# Patient Record
Sex: FEMALE | Race: WHITE | Employment: FULL TIME | URBAN - METROPOLITAN AREA
[De-identification: names, ages, dates, MRNs, and addresses within clinical notes are randomized per-mention and may not be internally consistent; named-entity substitution may affect disease eponyms.]

---

## 2017-01-19 PROCEDURE — 80069 RENAL FUNCTION PANEL: CPT | Performed by: INTERNAL MEDICINE

## 2017-01-19 PROCEDURE — 36415 COLL VENOUS BLD VENIPUNCTURE: CPT | Performed by: INTERNAL MEDICINE

## 2018-05-29 PROBLEM — I10 ESSENTIAL HYPERTENSION: Status: ACTIVE | Noted: 2018-05-29

## 2025-03-06 NOTE — H&P
[facility]  Division of Cardiology  Pre-procedure  Updated H&P       Patient Name: Violetta Caruso  MRN: [id number]  [facility]: [id number]  YOB: 1955    Diagnosis: Abnormal stress test.    Present Illness: See clinic consult note for details.  Atypical CP lead to a stress test that was abnormal and she was sent for an angiogram given the high risk features of the stress test.    Interval change: None.    Home Medications:  Medications Prior to Admission   Medication Sig    Cholecalciferol (VITAMIN D-3 OR) Take by mouth daily.    Multiple Vitamins-Minerals (MULTIVITAMIN WOMEN 50+ OR) Take 1 tablet by mouth daily.    VITAMIN K OR Take 1 tablet by mouth daily. Vitamin k complete  K1-500mcg  K2-1500mcg    Misc Natural Products (BEET ROOT OR) Take 2 tablets by mouth daily. gummy    NON FORMULARY Take 1 oz by mouth daily. NOPALEA-fruit of pricklepear cactus    NON FORMULARY Take by mouth daily. Nitric Oxide powder-totals below  2000mg beet root  L-arginine hydrochlocide 400mg  Arginine ketoglutarate 400 mg  L-citrulline 400 mg  Horse chestnut extract 300mg    losartan Potassium 50 MG Oral Tab Take 1 tablet (50 mg total) by mouth daily.    hydroCHLOROthiazide 12.5 MG Oral Cap Take 1 capsule (12.5 mg total) by mouth daily.    Magnesium 500 MG Oral Tab Take by mouth.    aspirin-acetaminophen-caffeine 250-250-65 MG Oral Tab Take 1 tablet by mouth every 6 (six) hours as needed for Pain.    Vitamin B-12 1000 MCG Oral Tab Take 1 tablet (1,000 mcg total) by mouth daily.    Glucosamine-Chondroit-Vit C-Mn (GLUCOSAMINE CHONDROITIN COMPLX) Oral Cap Take 2 tablets by mouth daily.         Allergies: Allergies[1]    History:  Past Medical History:    Esophageal reflux    Essential hypertension    FHx: migraine headaches    Fibroids    H/O: hysterectomy    Headaches, cluster    High blood pressure    Intermittent palpitations    Migraines    Visual impairment    wears glasses     Past  Surgical History:   Procedure Laterality Date    Correct bunion,othr methods Left 2014    Foot surgery Left     Hysterectomy      ovaries still in fibroids/ partial hysterectomy          x3     Social History     Tobacco Use    Smoking status: Former     Current packs/day: 0.00     Average packs/day: 0.5 packs/day for 6.0 years (3.0 ttl pk-yrs)     Types: Cigarettes     Start date: 1969     Quit date: 1975     Years since quittin.8    Smokeless tobacco: Never   Substance Use Topics    Alcohol use: Yes     Comment: socially     Family History   Problem Relation Age of Onset    Heart Disorder Father         mi 60's    Hypertension Father     Cancer Father         skin cancer    Other (Other) Father          in car accident, but he did have mva    Cancer Mother         tongue cancer /non smoker    Hypertension Mother     Other (Other) Mother     Diabetes Sister         borderline    Other (Other) Sister     Heart Disorder Brother         mi 50's    Other (Other) Brother     Other (Other) Brother         AAA    Other (Other) Son         lupus & hypothyroid    Cancer Maternal Grandmother         throat cancer    Cancer Paternal Grandmother         skin cancer    Other (Other) Paternal Grandfather         emphysema       Objective:  /83 (BP Location: Right arm)   Pulse 70   Temp 97.3 °F (36.3 °C) (Temporal)   Resp 16   Ht 160 cm (5' 3\")   Wt 177 lb (80.3 kg)   SpO2 97%   BMI 31.35 kg/m²     Exam:  General: NAD  Neck: No JVD  Lungs: CTA bilat  Heart: RRR, S1, S2  Abdomen: Soft, NT/ND, BS+x4  Extremities: Warm, dry, no LE edema bilat  Pulses: 2+ bilat DP  Skin: no rashes or legions noted  Neurological:  AAOx3, MAEW    Labs:  Please see the \"Easy-to-Access-N-Use\" [employer] EMR CareEverywherePP Tab!       Plan:  Cardiac catheterization, coronary angio, +/- PCI.  Further recommendations after above.    Informed Consent:  I've discussed the procedure at length with the patient including the  risks, benefits, and alternatives.  They wish to proceed.  I've reviewed the H&P and any changes within the last 30 days are noted above.    Thank you for allowing our group to care for your patient. Please contact me with any questions!     Efraín Herrera MD  General, Interventional  Structural & Endovascular  Cardiology                [1]   Allergies  Allergen Reactions    Advil [Ibuprofen] OTHER (SEE COMMENTS)     Other headache    Aleve [Naprelan] OTHER (SEE COMMENTS)     Severe headache    Amoxicillin OTHER (SEE COMMENTS)     Lip swelling    Codeine Phosphate NAUSEA AND VOMITING and OTHER (SEE COMMENTS)     Severe headache    Erythromycin Base OTHER (SEE COMMENTS)     Severe headache    Lodine [Etodolac] NAUSEA AND VOMITING and OTHER (SEE COMMENTS)     Other headache    Pcn [Penicillins] RASH and OTHER (SEE COMMENTS)     Lips swelled

## 2025-03-18 NOTE — PAT NURSING NOTE
Per PAT encounter/MyChart message sent to pt:    PreOp Instructions     You are scheduled for: a Cardiac Procedure     Date of Procedure: 03/21/25 Friday     Diet Instructions: Do not eat or drink anything after midnight including gum, mints, candy, etc.     Medications: Medications you are allowed to take can be taken with a sip of water the morning of your procedure, Take Aspirin 81 mg x 4 tablets the day of your procedure     Medications to Stop: Hold herbal supplements and vitamins     Other Medications: Do NOT take your Hydrochlorothiazide dose on Friday morning 3/21.     Skin Prep : Shower with antibacterial soap using a clean washcloth, prior to procedure. Once dried off, no lotions/powders/creams/ointments, etc., Do not shave the procedure area, this will be completed at the hospital during the preparation phase.     Arrival Time: The day prior to your procedure (Thursday) you will receive a phone call before 6:00 pm with your arrival time. If you haven't received a phone call, please check your voicemail messages., If you did not receive a voice mail and it is after 6:00 pm, please call the nursing supervisor at [phone].    Driving After Procedure: Sedation will be given so you WILL NOT be able to drive home. You will need a responsible adult  to drive you home. You can NOT take uber or taxi unless approved by your physician in advance.     Discharge Teaching: Your nurse will give you specific instructions before discharge, Most people can resume normal activities in 2-3 days, Any questions, please call the physician's office     We recommend you bring an overnight bag that you leave in the car the day of your procedure in case you need to stay overnight. We do not have lockers to lock up belongings so your  would have to hold onto the bag while you are in the procedure. Examples would be chargers for electronics you need, toiletries, change of clothes, etc.      parking is  available starting at 6 am or [facility]. Check in at the [facility] reception desk. Our  will be there to check you in for your procedure. Please bring your insurance cards and ID with you.                                                                                                                                      Please DO NOT respond to this message, the inbasket is not monitored for messages. For any questions, please call the physician's office.    Of note: reset pt's password in order for her to sign in for [employer] use.

## 2025-03-21 ENCOUNTER — HOSPITAL ENCOUNTER (OUTPATIENT)
Dept: INTERVENTIONAL RADIOLOGY/VASCULAR | Facility: HOSPITAL | Age: 70
Discharge: HOME | End: 2025-03-21
Attending: INTERNAL MEDICINE | Admitting: INTERNAL MEDICINE
Payer: COMMERCIAL

## 2025-03-21 VITALS
BODY MASS INDEX: 31.36 KG/M2 | WEIGHT: 177 LBS | OXYGEN SATURATION: 94 % | SYSTOLIC BLOOD PRESSURE: 118 MMHG | HEIGHT: 63 IN | DIASTOLIC BLOOD PRESSURE: 69 MMHG | TEMPERATURE: 97 F | HEART RATE: 76 BPM | RESPIRATION RATE: 17 BRPM

## 2025-03-21 DIAGNOSIS — R94.39 ABNORMAL STRESS TEST: ICD-10-CM

## 2025-03-21 PROCEDURE — B2111ZZ FLUOROSCOPY OF MULTIPLE CORONARY ARTERIES USING LOW OSMOLAR CONTRAST: ICD-10-PCS | Performed by: INTERNAL MEDICINE

## 2025-03-21 PROCEDURE — 99153 MOD SED SAME PHYS/QHP EA: CPT | Performed by: INTERNAL MEDICINE

## 2025-03-21 PROCEDURE — 4A023N7 MEASUREMENT OF CARDIAC SAMPLING AND PRESSURE, LEFT HEART, PERCUTANEOUS APPROACH: ICD-10-PCS | Performed by: INTERNAL MEDICINE

## 2025-03-21 PROCEDURE — 99152 MOD SED SAME PHYS/QHP 5/>YRS: CPT | Performed by: INTERNAL MEDICINE

## 2025-03-21 PROCEDURE — 93458 L HRT ARTERY/VENTRICLE ANGIO: CPT | Performed by: INTERNAL MEDICINE

## 2025-03-21 PROCEDURE — B2151ZZ FLUOROSCOPY OF LEFT HEART USING LOW OSMOLAR CONTRAST: ICD-10-PCS | Performed by: INTERNAL MEDICINE

## 2025-03-21 RX ORDER — MIDAZOLAM HYDROCHLORIDE 1 MG/ML
INJECTION INTRAMUSCULAR; INTRAVENOUS
Status: COMPLETED
Start: 2025-03-21 | End: 2025-03-21

## 2025-03-21 RX ORDER — HEPARIN SODIUM 5000 [USP'U]/ML
INJECTION, SOLUTION INTRAVENOUS; SUBCUTANEOUS
Status: COMPLETED
Start: 2025-03-21 | End: 2025-03-21

## 2025-03-21 RX ORDER — VERAPAMIL HYDROCHLORIDE 2.5 MG/ML
INJECTION, SOLUTION INTRAVENOUS
Status: COMPLETED
Start: 2025-03-21 | End: 2025-03-21

## 2025-03-21 RX ORDER — NITROGLYCERIN 20 MG/100ML
INJECTION INTRAVENOUS
Status: COMPLETED
Start: 2025-03-21 | End: 2025-03-21

## 2025-03-21 RX ORDER — LIDOCAINE HYDROCHLORIDE 10 MG/ML
INJECTION, SOLUTION EPIDURAL; INFILTRATION; INTRACAUDAL; PERINEURAL
Status: COMPLETED
Start: 2025-03-21 | End: 2025-03-21

## 2025-03-21 RX ORDER — SODIUM CHLORIDE 9 MG/ML
INJECTION, SOLUTION INTRAVENOUS
Status: COMPLETED | OUTPATIENT
Start: 2025-03-21 | End: 2025-03-21

## 2025-03-21 RX ADMIN — SODIUM CHLORIDE: 9 INJECTION, SOLUTION INTRAVENOUS at 12:47:00

## 2025-03-21 NOTE — PROGRESS NOTES
Pt received s/p LHC with Dr. Herrera. Right radial arterial access site closed with Vasc band with air in cuff. Site CDI, no bleeding or hematoma present. Vasc band removed per protocol. Site remains unchanged. Recovery complete. Discharge instructions given to pt and pt's . IV discontinued. Dr. Herrera spoke with pt and pt's family. Pt taken down to [facility] via wheelchair for discharge.

## 2025-03-21 NOTE — PROCEDURES
[facility] Division of Cardiology   Cardiac Catheterization & Percutaneous Coronary Intervention     Violetta Caruso Location: [facility] Cath Lab    CSN [id number] MRN [id number]   Admission Date 3/21/2025 Procedure Date 3/21/2025   Attending Physician Camila Zafar MD Procedure Physician Efraín Herrera MD     PREOPERATIVE DIAGNOSIS:  Abnormal stress test, chest pain  POSTOPERATIVE DIAGNOSIS:  Mild, non-obstructive CAD  PROCEDURE PERFORMED:  Coronary angiogram      PROCEDURE:    Moderate sedation: The patient was brought to the cardiac catheterization lab in the fasting state.  Informed consent was previously obtained.  Moderate sedation was employed using 3mg IV Versed and 75mcg IV Fentanyl.  I directly observed the patient from 1446 to 1510, watching the heart rate, blood pressure, oximetry, and rhythm.  An independent, trained observer was present throughout the procedure and assisted in the monitoring of the patient's level of consciousness and physiologic states.  Please see the Partly Catheterization Report (MCRTM) in [facility] for further, technical details.  Vascular access and catheter placement:  A 6F right radial, slender sheath was utilized after micropuncture access gained.  A Fallon catheter was used for LM and for RCA engagement and subsequent angiography.  Standard over the wire technique was utilized.  I used a pigtail catheter to cross the aortic valve and measure LVEDP and the transaortic pullback gradient.  We performed a contrast ventriculogram. SONG and MARTINEZ angiographic views with caudal and cranial angulation were used for cineangiography.  Hemodynamics were measured in the standard fashion using fluid filled, pressure manometry via the ASSISTTM device and analyzed with the [employer] catheterization software.  At the end of the case, a TRTM band was used for arterial hemostasis and our standard protocol was followed.         DIAGNOSTIC FINDINGS:    Left heart  catheterization:  Left ventricular end-diastolic pressure (LVEDP) was 16mmHg.    LV ejection fraction was approximately  75% with no significant angiographic mitral regurgitation; no regional wall motion abnormalities were noted.  No significant transaortic gradient by pullback was measured.    Coronary angiography:    LMCA: The left main artery is normal.  LAD:  The left anterior descending artery is large vessel with mild disease.  LCX: The left circumflex artery is large system with mild disase.   RCA:  The right coronary artery is large, dominant vessel with mild disease.    MEDICATIONS:  See nursing records, MCR, and EMR.     COMPLICATIONS:  None.     IMPRESSION:    Mild, non-obstructive CAD  Normal LV size and hyperdynamic LV    RECOMMENDATIONS:   Medically manage non-obstructive CAD.  Follow up with Dr. Zafar for further evaluation and therapy.    Efraín Herrera MD  General, Interventional  Structural & Endovascular  Cardiology